# Patient Record
Sex: FEMALE | Race: WHITE | NOT HISPANIC OR LATINO
[De-identification: names, ages, dates, MRNs, and addresses within clinical notes are randomized per-mention and may not be internally consistent; named-entity substitution may affect disease eponyms.]

---

## 2024-04-29 ENCOUNTER — APPOINTMENT (OUTPATIENT)
Dept: BARIATRICS/WEIGHT MGMT | Facility: CLINIC | Age: 44
End: 2024-04-29
Payer: COMMERCIAL

## 2024-04-29 ENCOUNTER — NON-APPOINTMENT (OUTPATIENT)
Age: 44
End: 2024-04-29

## 2024-04-29 ENCOUNTER — TRANSCRIPTION ENCOUNTER (OUTPATIENT)
Age: 44
End: 2024-04-29

## 2024-04-29 VITALS
SYSTOLIC BLOOD PRESSURE: 125 MMHG | HEIGHT: 67 IN | RESPIRATION RATE: 18 BRPM | BODY MASS INDEX: 41.3 KG/M2 | HEART RATE: 74 BPM | DIASTOLIC BLOOD PRESSURE: 76 MMHG | WEIGHT: 263.13 LBS | OXYGEN SATURATION: 96 %

## 2024-04-29 DIAGNOSIS — Z80.1 FAMILY HISTORY OF MALIGNANT NEOPLASM OF TRACHEA, BRONCHUS AND LUNG: ICD-10-CM

## 2024-04-29 DIAGNOSIS — Z80.0 FAMILY HISTORY OF MALIGNANT NEOPLASM OF DIGESTIVE ORGANS: ICD-10-CM

## 2024-04-29 DIAGNOSIS — Z80.3 FAMILY HISTORY OF MALIGNANT NEOPLASM OF BREAST: ICD-10-CM

## 2024-04-29 DIAGNOSIS — K21.9 GASTRO-ESOPHAGEAL REFLUX DISEASE W/OUT ESOPHAGITIS: ICD-10-CM

## 2024-04-29 DIAGNOSIS — Z82.49 FAMILY HISTORY OF ISCHEMIC HEART DISEASE AND OTHER DISEASES OF THE CIRCULATORY SYSTEM: ICD-10-CM

## 2024-04-29 DIAGNOSIS — Z82.3 FAMILY HISTORY OF STROKE: ICD-10-CM

## 2024-04-29 PROBLEM — Z00.00 ENCOUNTER FOR PREVENTIVE HEALTH EXAMINATION: Status: ACTIVE | Noted: 2024-04-29

## 2024-04-29 PROCEDURE — G2211 COMPLEX E/M VISIT ADD ON: CPT

## 2024-04-29 PROCEDURE — 99205 OFFICE O/P NEW HI 60 MIN: CPT

## 2024-04-29 RX ORDER — NORETHINDRONE 0.35 MG/1
TABLET ORAL
Refills: 0 | Status: ACTIVE | COMMUNITY
Start: 2024-04-29

## 2024-04-29 RX ORDER — CETIRIZINE HYDROCHLORIDE 10 MG/1
10 TABLET, FILM COATED ORAL
Qty: 30 | Refills: 0 | Status: ACTIVE | COMMUNITY
Start: 2024-04-29

## 2024-04-29 NOTE — ASSESSMENT
[FreeTextEntry1] : 43F PMH class III obesity, NAFLD, PVCs, GERD, arrhythmia, migraines, who presents to weight management for initial evaluation.   # Class III obesity c/b NAFLD: Weight today 263 lbs 2 oz, BMI 41.21, BMI 49.4%. Struggled with weight during much of her life but particularly has gained over the past 14 years (~90 lbs), in part in the setting of pregnancies. Has engaged in numerous dietary interventions without sustained success. Diet noted for utilizing shakes/bars (optavia), some snacks and sweets, take-out. Sedentary lifestyle.  - Food log - Meal planning/prep - Dietician referral - copy of most recent labs - Will continue to discuss increasing activity as able, time effective strategies - Discussed medical tx at length, Wegovy prescribed - F/u in 1 month

## 2024-04-29 NOTE — HISTORY OF PRESENT ILLNESS
[FreeTextEntry1] : 43F PMH class III obesity, NAFLD, PVCs, GERD, arrhythmia, migraines, who presents to weight management for initial evaluation.   Weight/Diet History: Maybe a little chunky as a kid. Thinned out in teenage years. Put on weight after  14 years ago (estimates 170-180 lbs at that time), put on 20 lbs in first pregnancy, then another 10-20 subsequent pregnancies, has had 3.  Has engaged in weight loss interventions, including Weight Watchers, Optivia, Keto, Paleo, [place], worked with a functional nutritionist.  Optavia last year, lost 10 lbs immediately, then another 10 pounds.  Currently at heaviest weight. Now starting to have knee pain and more   Weight today: 263 lbs 2 oz, BMI 41.21, BMI 49.4%   Diet:  B (): Optavia shake Snack: optavia bar (has some leftover) L (): Salad D (8-9 pm): Ground turkey, tacos Snack: midmorning bar (see above), sometimes when gets home from work, fruit or cookies.  Night-time eating: Minimal.  Beverages: Coffee x2 (splash of half and half and no sweetener), may have a 3rd in the afternoon (iced latte with skim milk) Fast-food/Restaurants: 3-4x/wk, Pizza, Mexican, burger. Cook/Prepare meals: Food Journal: In the functional nutritionist she did it for a few days   Exercise: Reports no exercise limitations. Currently doesn't engage in exercise.    Sleep: Estimates 6 hours. From 11:30 to 6 am. Unsure if snoring.    Social Hx: No tobacco, social alcohol. 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 Lives with  and 3 children.  No FHx thyroid cancer.   Last blood work in January, CPE at [facility].

## 2024-06-10 ENCOUNTER — RX RENEWAL (OUTPATIENT)
Age: 44
End: 2024-06-10

## 2024-06-20 PROBLEM — E66.01 CLASS 3 SEVERE OBESITY WITH SERIOUS COMORBIDITY IN ADULT: Status: ACTIVE | Noted: 2024-04-29

## 2024-06-20 PROBLEM — K76.0 NAFLD (NONALCOHOLIC FATTY LIVER DISEASE): Status: ACTIVE | Noted: 2024-04-29

## 2024-06-21 ENCOUNTER — APPOINTMENT (OUTPATIENT)
Dept: BARIATRICS/WEIGHT MGMT | Facility: CLINIC | Age: 44
End: 2024-06-21
Payer: COMMERCIAL

## 2024-06-21 VITALS — BODY MASS INDEX: 39.93 KG/M2 | WEIGHT: 254.4 LBS | HEIGHT: 67 IN

## 2024-06-21 DIAGNOSIS — K76.0 FATTY (CHANGE OF) LIVER, NOT ELSEWHERE CLASSIFIED: ICD-10-CM

## 2024-06-21 DIAGNOSIS — E66.01 MORBID (SEVERE) OBESITY DUE TO EXCESS CALORIES: ICD-10-CM

## 2024-06-21 PROCEDURE — 99214 OFFICE O/P EST MOD 30 MIN: CPT

## 2024-06-21 PROCEDURE — G2211 COMPLEX E/M VISIT ADD ON: CPT

## 2024-06-21 RX ORDER — SEMAGLUTIDE 1 MG/.5ML
1 INJECTION, SOLUTION SUBCUTANEOUS
Qty: 1 | Refills: 1 | Status: ACTIVE | COMMUNITY
Start: 2024-04-29 | End: 1900-01-01

## 2024-06-21 RX ORDER — SEMAGLUTIDE 0.25 MG/.5ML
0.25 INJECTION, SOLUTION SUBCUTANEOUS
Qty: 1 | Refills: 1 | Status: DISCONTINUED | COMMUNITY
Start: 2024-04-29 | End: 2024-06-21

## 2024-06-23 NOTE — ASSESSMENT
[FreeTextEntry1] : 43F PMH class III obesity, NAFLD, PVCs, GERD, arrhythmia, migraines, who presents to weight management for follow-up.   # Class III obesity c/b NAFLD: Weight today 254.4 lbs, BMI 39.84, down 8.6 lbs since initial visit ~8 weeks ago, doing well on Wegovy, just increased to 0.5 mg/wk. Notes dietary improvements but exercise has remained insufficient, but she plans to increase after school year ends.  - Food log - Meal planning/prep - Dietician referral - copy of most recent labs - Will continue to discuss increasing activity as able, time effective strategies - C/w .5 mg Wegovy for this month, plan for increase to 1 mg after - F/u in 1-2 mo

## 2024-06-23 NOTE — HISTORY OF PRESENT ILLNESS
[FreeTextEntry1] : 43F PMH class III obesity, NAFLD, PVCs, GERD, arrhythmia, migraines, who presents to weight management for follow-up.   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 Had engaged in numerous dietary interventions without sustained success. Diet noted for utilizing shakes/bars (optavia), some snacks and sweets, take-out. Sedentary lifestyle.  We discussed lifestyle and dietary interventions. She was prescribed Wegovy.    Weight today: 254.4 lbs, BMI 39.84 Weight at initial Visit (4/29/24): 263 lbs 2 oz, BMI 41.21, BMI 49.4%   Medication: She is taking Wegovy, started 0.5 mg/wk this week. Occasional nausea. Can only eat smaller portion of plate.   Diet: Eating healthier, much less, sometimes too late dinner (8:30 pm) bc of kids' schedules.    Exercise: Reports no exercise limitations. Currently doesn't engage in exercise. 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    Sleep:  (Estimates 6 hours. From 11:30 to 6 am. Unsure if snoring.   Last blood work in January, CPE at [facility].

## 2024-06-23 NOTE — PHYSICAL EXAM
[Obese, well nourished, in no acute distress] : obese, well nourished, in no acute distress [de-identified] : TEB visit

## 2024-07-24 ENCOUNTER — RX RENEWAL (OUTPATIENT)
Age: 44
End: 2024-07-24

## 2024-07-30 ENCOUNTER — RX RENEWAL (OUTPATIENT)
Age: 44
End: 2024-07-30

## 2024-08-08 ENCOUNTER — APPOINTMENT (OUTPATIENT)
Dept: BARIATRICS/WEIGHT MGMT | Facility: CLINIC | Age: 44
End: 2024-08-08

## 2024-08-08 PROCEDURE — 99214 OFFICE O/P EST MOD 30 MIN: CPT

## 2024-08-08 PROCEDURE — G2211 COMPLEX E/M VISIT ADD ON: CPT

## 2024-08-08 NOTE — ASSESSMENT
[FreeTextEntry1] : 43F PMH class III obesity, NAFLD, PVCs, GERD, arrhythmia, migraines, who presents to weight management for follow-up.   # Class III obesity c/b NAFLD: Weight today 254 lbs 2 oz, BMI 39.8, BF 51.2%. She initially lost weight rapidly when she started, weight hasn't changed much over the past 1.5 months increase in dose, I suspect much of what was lost initially was water weight and she has subsequently replaced some of that with fat loss. Notes eating less, trying to eat healthier. Exercise is limited.  - Food log - Meal planning/prep - Dietician referral - copy of most recent labs - Will continue to discuss increasing activity as able, time effective strategies - May increase Wegovy to 1.7 mg/wk - F/u in ~2 mo

## 2024-08-08 NOTE — HISTORY OF PRESENT ILLNESS
[FreeTextEntry1] : 43F PMH class III obesity, NAFLD, PVCs, GERD, arrhythmia, migraines, who presents to weight management for follow-up.   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 Had engaged in numerous dietary interventions without sustained success. Diet noted for utilizing shakes/bars (optavia), some snacks and sweets, take-out. Sedentary lifestyle.  We discussed lifestyle and dietary interventions. She was prescribed Wegovy.    Weight today: 254 lbs 2 oz, BMI 39.8, BF 51.2% Weight at last visit (6/21/24): 254.4 lbs, BMI 39.84 Weight at initial Visit (4/29/24): 263 lbs 2 oz, BMI 41.21, BMI 49.4%   Medication: She is taking Wegovy 1 mg/wk this week. Occasional nausea or heartburn. Can only eat smaller portion of plate.   Diet: Eating less overall. Estimates 3 meals most days. Getting more vegetables. Less carbs, more protein. Sometimes too late dinner (8:30 pm) bc of kids' schedules.    Exercise: Did a water aerobics class recently (Reports no exercise limitations. Currently doesn't engage in exercise. 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    Sleep: Estimates 6 hours. From 11:30 to 6 am. Unsure if snoring.   Last blood work in January, CPE at [facility].

## 2024-08-08 NOTE — HISTORY OF PRESENT ILLNESS
[FreeTextEntry1] : 43F PMH class III obesity, NAFLD, PVCs, GERD, arrhythmia, migraines, who presents to weight management for follow-up.   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 Had engaged in numerous dietary interventions without sustained success. Diet noted for utilizing shakes/bars (optavia), some snacks and sweets, take-out. Sedentary lifestyle.  We discussed lifestyle and dietary interventions. She was prescribed Wegovy.    Weight today: 254 lbs 2 oz, BMI 39.8, BF 51.2% Weight at last visit (6/21/24): 254.4 lbs, BMI 39.84 Weight at initial Visit (4/29/24): 263 lbs 2 oz, BMI 41.21, BMI 49.4%   Medication: She is taking Wegovy 1 mg/wk this week. Occasional nausea or heartburn. Can only eat smaller portion of plate.   Diet: Eating less overall. Estimates 3 meals most days. Getting more vegetables. Less carbs, more protein. Sometimes too late dinner (8:30 pm) bc of kids' schedules.    Exercise: Did a water aerobics class recently (Reports no exercise limitations. Currently doesn't engage in exercise. 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    Sleep: Estimates 6 hours. From 11:30 to 6 am. Unsure if snoring.   Last blood work in January, CPE at [facility].

## 2024-09-03 ENCOUNTER — TRANSCRIPTION ENCOUNTER (OUTPATIENT)
Age: 44
End: 2024-09-03

## 2024-09-03 RX ORDER — OMEPRAZOLE 20 MG/1
20 TABLET, DELAYED RELEASE ORAL
Qty: 30 | Refills: 1 | Status: ACTIVE | COMMUNITY
Start: 2024-09-03 | End: 1900-01-01

## 2024-10-06 PROBLEM — E66.813 CLASS 3 SEVERE OBESITY WITH SERIOUS COMORBIDITY IN ADULT: Status: ACTIVE | Noted: 2024-04-29

## 2024-10-07 ENCOUNTER — APPOINTMENT (OUTPATIENT)
Dept: BARIATRICS/WEIGHT MGMT | Facility: CLINIC | Age: 44
End: 2024-10-07
Payer: COMMERCIAL

## 2024-10-07 VITALS
BODY MASS INDEX: 38.23 KG/M2 | WEIGHT: 252.25 LBS | HEART RATE: 77 BPM | RESPIRATION RATE: 16 BRPM | OXYGEN SATURATION: 95 % | DIASTOLIC BLOOD PRESSURE: 70 MMHG | HEIGHT: 68 IN | SYSTOLIC BLOOD PRESSURE: 130 MMHG

## 2024-10-07 DIAGNOSIS — Z00.00 ENCOUNTER FOR GENERAL ADULT MEDICAL EXAMINATION W/OUT ABNORMAL FINDINGS: ICD-10-CM

## 2024-10-07 DIAGNOSIS — K76.0 FATTY (CHANGE OF) LIVER, NOT ELSEWHERE CLASSIFIED: ICD-10-CM

## 2024-10-07 DIAGNOSIS — E66.813 OBESITY, CLASS 3: ICD-10-CM

## 2024-10-07 DIAGNOSIS — K21.9 GASTRO-ESOPHAGEAL REFLUX DISEASE W/OUT ESOPHAGITIS: ICD-10-CM

## 2024-10-07 DIAGNOSIS — E66.01 OBESITY, CLASS 3: ICD-10-CM

## 2024-10-07 PROCEDURE — G2211 COMPLEX E/M VISIT ADD ON: CPT | Mod: NC

## 2024-10-07 PROCEDURE — 99214 OFFICE O/P EST MOD 30 MIN: CPT

## 2024-10-07 RX ORDER — FAMOTIDINE 20 MG/1
20 TABLET, FILM COATED ORAL
Qty: 180 | Refills: 2 | Status: ACTIVE | COMMUNITY
Start: 2024-10-07 | End: 1900-01-01

## 2024-11-11 ENCOUNTER — APPOINTMENT (OUTPATIENT)
Dept: BARIATRICS/WEIGHT MGMT | Facility: CLINIC | Age: 44
End: 2024-11-11

## 2024-11-11 PROCEDURE — ZZZZZ: CPT

## 2024-12-18 ENCOUNTER — RX RENEWAL (OUTPATIENT)
Age: 44
End: 2024-12-18

## 2025-01-06 ENCOUNTER — APPOINTMENT (OUTPATIENT)
Dept: BARIATRICS/WEIGHT MGMT | Facility: CLINIC | Age: 45
End: 2025-01-06
Payer: COMMERCIAL

## 2025-01-06 VITALS
BODY MASS INDEX: 36.73 KG/M2 | SYSTOLIC BLOOD PRESSURE: 112 MMHG | HEART RATE: 67 BPM | HEIGHT: 68 IN | OXYGEN SATURATION: 99 % | WEIGHT: 242.38 LBS | DIASTOLIC BLOOD PRESSURE: 56 MMHG

## 2025-01-06 DIAGNOSIS — E66.01 OBESITY, CLASS 3: ICD-10-CM

## 2025-01-06 DIAGNOSIS — K76.0 FATTY (CHANGE OF) LIVER, NOT ELSEWHERE CLASSIFIED: ICD-10-CM

## 2025-01-06 DIAGNOSIS — E66.813 OBESITY, CLASS 3: ICD-10-CM

## 2025-01-06 DIAGNOSIS — K21.9 GASTRO-ESOPHAGEAL REFLUX DISEASE W/OUT ESOPHAGITIS: ICD-10-CM

## 2025-01-06 PROCEDURE — 99214 OFFICE O/P EST MOD 30 MIN: CPT

## 2025-01-06 PROCEDURE — G2211 COMPLEX E/M VISIT ADD ON: CPT | Mod: NC

## 2025-04-07 ENCOUNTER — APPOINTMENT (OUTPATIENT)
Dept: BARIATRICS/WEIGHT MGMT | Facility: CLINIC | Age: 45
End: 2025-04-07